# Patient Record
Sex: MALE | Race: BLACK OR AFRICAN AMERICAN | Employment: FULL TIME | URBAN - METROPOLITAN AREA
[De-identification: names, ages, dates, MRNs, and addresses within clinical notes are randomized per-mention and may not be internally consistent; named-entity substitution may affect disease eponyms.]

---

## 2017-05-26 ENCOUNTER — HOSPITAL ENCOUNTER (EMERGENCY)
Age: 22
Discharge: HOME | End: 2017-05-26
Attending: EMERGENCY MEDICINE
Payer: SELF-PAY

## 2017-05-26 ENCOUNTER — APPOINTMENT (OUTPATIENT)
Dept: GENERAL RADIOLOGY | Age: 22
End: 2017-05-26
Attending: EMERGENCY MEDICINE
Payer: SELF-PAY

## 2017-05-26 VITALS
HEIGHT: 62 IN | BODY MASS INDEX: 57.41 KG/M2 | SYSTOLIC BLOOD PRESSURE: 157 MMHG | HEART RATE: 87 BPM | WEIGHT: 312 LBS | DIASTOLIC BLOOD PRESSURE: 88 MMHG | RESPIRATION RATE: 19 BRPM | TEMPERATURE: 98.6 F | OXYGEN SATURATION: 99 %

## 2017-05-26 DIAGNOSIS — M25.572 ACUTE LEFT ANKLE PAIN: Primary | ICD-10-CM

## 2017-05-26 PROCEDURE — 74011250637 HC RX REV CODE- 250/637: Performed by: PHYSICIAN ASSISTANT

## 2017-05-26 PROCEDURE — 99283 EMERGENCY DEPT VISIT LOW MDM: CPT | Performed by: PHYSICIAN ASSISTANT

## 2017-05-26 PROCEDURE — 73610 X-RAY EXAM OF ANKLE: CPT

## 2017-05-26 RX ORDER — IBUPROFEN 800 MG/1
800 TABLET ORAL
Status: COMPLETED | OUTPATIENT
Start: 2017-05-26 | End: 2017-05-26

## 2017-05-26 RX ORDER — IBUPROFEN 800 MG/1
800 TABLET ORAL
Qty: 30 TAB | Refills: 0 | Status: SHIPPED | OUTPATIENT
Start: 2017-05-26

## 2017-05-26 RX ADMIN — IBUPROFEN 800 MG: 800 TABLET ORAL at 12:14

## 2017-05-26 NOTE — ED PROVIDER NOTES
HPI Comments: Patient presents to the ER complaining of 3 weeks history of left ankle/top of left ankle pain that is worse with lateral movement of left foot. Denies worsening pain with weightbearing or walking. 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 States he may have first felt pain when stepping out of the shower 3 weeks ago. No fall. Denies swelling, sensorimotor deficits or diabetes. No h/o gout, fever, nausea, vomiting. Patient is a 19 y.o. male presenting with ankle pain. Ankle Pain           No past medical history on file. No past surgical history on file. No family history on file. Social History     Social History    Marital status: SINGLE     Spouse name: N/A    Number of children: N/A    Years of education: N/A     Occupational History    Not on file. Social History Main Topics    Smoking status: Not on file    Smokeless tobacco: Not on file    Alcohol use Not on file    Drug use: Not on file    Sexual activity: Not on file     Other Topics Concern    Not on file     Social History Narrative         ALLERGIES: Review of patient's allergies indicates no known allergies. Review of Systems   Constitutional: Negative for chills, fatigue and fever. Musculoskeletal: Positive for arthralgias. Vitals:    05/26/17 1034   BP: 169/89   Pulse: 100   Resp: 20   Temp: 97.6 °F (36.4 °C)   SpO2: 99%   Weight: 141.5 kg (312 lb)   Height: 5' 2\" (1.575 m)            Physical Exam   Constitutional: He is oriented to person, place, and time. He appears well-developed and well-nourished. Very pleasant, heavyset male in no acute distress whatsoever. He can walk unassisted on both lower extremities. HENT:   Head: Normocephalic. Eyes: Conjunctivae and EOM are normal. Pupils are equal, round, and reactive to light. Neck: Normal range of motion. Neck supple. Abdominal: Soft. Bowel sounds are normal.   Musculoskeletal: Normal range of motion.  He exhibits no edema. Left foot: There is tenderness and swelling. There is normal range of motion, no bony tenderness, normal capillary refill, no deformity and no laceration. Feet:    Neurological: He is alert and oriented to person, place, and time. He has normal reflexes. No cranial nerve deficit. Coordination normal.   No Sensorimotor deficits. Skin: Skin is warm and dry. No rash noted. No erythema. Left foot and ankle not hot, no erythema or edema. Nursing note and vitals reviewed. MDM  Number of Diagnoses or Management Options  Acute left ankle pain: new and requires workup     Amount and/or Complexity of Data Reviewed  Tests in the radiology section of CPT®: ordered and reviewed  Independent visualization of images, tracings, or specimens: yes (I reviewed and interpreted left ankle x-ray myself.)    Risk of Complications, Morbidity, and/or Mortality  Presenting problems: moderate  Management options: moderate    Patient Progress  Patient progress: improved    ED Course       Procedures      The patient was observed in the ED. Results Reviewed:    XR ANKLE LT MIN 3 V    (Results Pending)     Patient with 3 week history of left ankle pain, no known injury. Patient can weight bear which does not increase pain. Likely a soft tissue/ligamentous or tendon strain. Will give course of nonsteroidals, our RICE instructions. Patient requests work note. I discussed the results of all labs, procedures, radiographs, and treatments with the patient and available family. Treatment plan is agreed upon and the patient is ready for discharge. All voiced understanding of the discharge plan and medication instructions or changes as appropriate. Questions about treatment in the ED were answered. All were encouraged to return should symptoms worsen or new problems develop.

## 2017-05-26 NOTE — LETTER
[facility] [facility] EMERGENCY DEPT One [address] [id number] [phone] Work/School Note Date: 5/26/2017 To Whom It May concern: 
 
Orion Alfred was seen and treated today in the emergency room by the following provider(s): 
Attending Provider: Shaquille Mars MD 
Physician Assistant: CALEB Nichols. Sincerely, CALEB Nichols

## 2018-05-10 ENCOUNTER — HOSPITAL ENCOUNTER (EMERGENCY)
Age: 23
Discharge: HOME | End: 2018-05-10
Attending: EMERGENCY MEDICINE
Payer: SELF-PAY

## 2018-05-10 VITALS
HEIGHT: 62 IN | OXYGEN SATURATION: 98 % | DIASTOLIC BLOOD PRESSURE: 84 MMHG | HEART RATE: 101 BPM | TEMPERATURE: 98.3 F | SYSTOLIC BLOOD PRESSURE: 160 MMHG | RESPIRATION RATE: 18 BRPM

## 2018-05-10 DIAGNOSIS — S39.94XA PENIS INJURY, INITIAL ENCOUNTER: Primary | ICD-10-CM

## 2018-05-10 PROCEDURE — 81003 URINALYSIS AUTO W/O SCOPE: CPT | Performed by: PHYSICIAN ASSISTANT

## 2018-05-10 PROCEDURE — 99284 EMERGENCY DEPT VISIT MOD MDM: CPT | Performed by: PHYSICIAN ASSISTANT

## 2018-05-10 PROCEDURE — 87491 CHLMYD TRACH DNA AMP PROBE: CPT | Performed by: PHYSICIAN ASSISTANT

## 2018-05-10 RX ORDER — MUPIROCIN 20 MG/G
OINTMENT TOPICAL 3 TIMES DAILY
Qty: 30 G | Refills: 0 | Status: SHIPPED | OUTPATIENT
Start: 2018-05-10

## 2018-05-10 RX ORDER — SULFAMETHOXAZOLE AND TRIMETHOPRIM 800; 160 MG/1; MG/1
1 TABLET ORAL 2 TIMES DAILY
Qty: 20 TAB | Refills: 0 | Status: SHIPPED | OUTPATIENT
Start: 2018-05-10 | End: 2018-05-20

## 2018-05-10 NOTE — ED NOTES
I have reviewed discharge instructions with the patient. The patient verbalized understanding. Patient left ED via Discharge Method: stretcher to Home with (SELF). Opportunity for questions and clarification provided. Patient given 2 scripts. To continue your aftercare when you leave the hospital, you may receive an automated call from our care team to check in on how you are doing. This is a free service and part of our promise to provide the best care and service to meet your aftercare needs.  If you have questions, or wish to unsubscribe from this service please call [phone]. Thank you for Choosing our [facility] Emergency Department.

## 2018-05-10 NOTE — Clinical Note
Wash two-three times daily with soap and water, blot dry, apply neosporin and a clean dressing. Watch for redness, swelling, pus, increasing pain, fever and return if any of those symptoms began. Return to the ED if worse.

## 2018-05-10 NOTE — LETTER
[address] [facility] EMERGENCY DEPT One [address] [id number] [phone] Work/School Note Date: 5/10/2018 To Whom It May concern: 
 
Monserrat Koo was seen and treated today in the emergency room by the following provider(s): 
Attending Provider: Va Huerta MD 
Physician Assistant: CALEB Clement. Monserrat Koo may return to work on 05/11/18. Sincerely, CALEB Clement

## 2018-05-10 NOTE — DISCHARGE INSTRUCTIONS
Wound Care: After Your Visit to the Emergency Room  Your Care Instructions  The care you need depends on the type of wound you have. Taking good care of your wound at home will help it heal quickly and will reduce your chance of infection. Even though you have been released from the emergency room, you still need to watch for any problems. The doctor carefully checked you. But sometimes problems can develop later. If you have new symptoms, or if your symptoms do not get better, return to the emergency room or call your doctor right away. A visit to the emergency room is only one step in your treatment. Even if you feel better, you still need to do what your doctor recommends, such as going to all suggested follow-up appointments and taking medicines exactly as directed. This will help you recover and help prevent future problems. How can you care for yourself at home? · Clean the area with soap and water 2 times a day, or as your doctor tells you. Don't use hydrogen peroxide or alcohol, which can slow healing. ¨ Unless your doctor gives you other directions, cover the wound with a thin layer of antibiotic ointment, such as bacitracin, and a bandage. Do not use an ointment that contains neomycin, because it can irritate the skin. ¨ Apply more ointment and replace the bandage as your doctor tells you. ¨ If the bandage is stuck to a scab, soak it in warm water to soften the scab. This will make the bandage easier to remove. · Ask your doctor if you can take an over-the-counter pain medicine. Do not take two or more pain medicines at the same time unless the doctor told you to. · Some pain is normal with a wound, but do not ignore pain that is getting worse instead of better. You could have an infection. · Your doctor may have closed your wound with stitches (sutures), staples, or skin glue. ¨ If you have stitches, your doctor may remove them after several days to 2 weeks.  Or you may have stitches that dissolve on their own. ¨ If you have staples, your doctor may remove them after 7 to 10 days. ¨ If your wound was closed with skin glue, the glue will wear off in a few days to 2 weeks. When should you call for help? Return to the emergency room now if:  · You have signs of infection, such as:  ¨ Increased pain, swelling, warmth, or redness around the wound. ¨ Red streaks leading from the wound. ¨ Pus draining from the wound. ¨ Swollen lymph nodes in your neck, armpits, or groin. ¨ A fever. · The wound starts to bleed, and blood soaks through the bandage. (Oozing small amounts of blood is normal.)  Call your doctor today if:  · The wound is not getting better each day. Where can you learn more? Go to [employer]  Enter P907 in the search box to learn more about \"Wound Care: After Your Visit to the Emergency Room. \"   © 5308-1819 [employer], [employer]orporated. Care instructions adapted under license by [facility] [employer] (which disclaims liability or warranty for this information). This care instruction is for use with your licensed healthcare professional. If you have questions about a medical condition or this instruction, always ask your healthcare professional. [place] [address] any warranty or liability for your use of this information.   Content Version: 9.3.73386; Last Revised: July 22, 2010

## 2018-05-10 NOTE — ED PROVIDER NOTES
HPI Comments: Patient is here with pain to his penis after he accidentally zipped it in his zipper the other day. This was 2-3 days ago. He has some crusting to the area today. There is no swelling to the area. No inguinal lymphadenopathy but he has had a little bit of dysuria. There was no injury to his urethra distally. No injury to his scrotum or testicles. He was ambulatory to the room without difficulty and well hydrated. No fever. Patient is a 23 y.o. male presenting with penile problem. The history is provided by the patient. Penis Injury   This is a new problem. The problem occurs constantly. The problem has not changed since onset. Primary symptoms include penile pain. Pertinent negatives include no dysuria, no genital itching, no genital lesions, no genital rash, no penile discharge, no testicular mass, no swelling, no scrotal pain, no priapism and no inability to urinate. There has been no fever. No past medical history on file. No past surgical history on file. No family history on file. Social History     Social History    Marital status: SINGLE     Spouse name: N/A    Number of children: N/A    Years of education: N/A     Occupational History    Not on file. Social History Main Topics    Smoking status: Not on file    Smokeless tobacco: Not on file    Alcohol use Not on file    Drug use: Not on file    Sexual activity: Not on file     Other Topics Concern    Not on file     Social History Narrative         ALLERGIES: Review of patient's allergies indicates no known allergies. Review of Systems   Constitutional: Negative. HENT: Negative. Eyes: Negative. Respiratory: Negative. Cardiovascular: Negative. Gastrointestinal: Negative. Genitourinary: Positive for penile pain. Negative for dysuria and penile discharge. Musculoskeletal: Negative. Skin: Negative. Neurological: Negative. Psychiatric/Behavioral: Negative.     All other systems reviewed and are negative. Vitals:    05/10/18 1032   BP: 160/84   Pulse: (!) 101   Resp: 18   Temp: 98.3 °F (36.8 °C)   SpO2: 98%   Height: 5' 2\" (1.575 m)            Physical Exam   Constitutional: He is oriented to person, place, and time. He appears well-developed and well-nourished. HENT:   Head: Normocephalic and atraumatic. Right Ear: External ear normal.   Left Ear: External ear normal.   Nose: Nose normal.   Mouth/Throat: Oropharynx is clear and moist.   Eyes: Conjunctivae and EOM are normal. Pupils are equal, round, and reactive to light. Neck: Normal range of motion. Neck supple. Cardiovascular: Normal rate, regular rhythm, normal heart sounds and intact distal pulses. Pulmonary/Chest: Effort normal and breath sounds normal.   Abdominal: Soft. Bowel sounds are normal.   Genitourinary:       No swelling in the scrotum or testes. Penile tenderness present. No penile erythema. No discharge found. Genitourinary Comments: Dr. Lida Pierce into see patient with me. Musculoskeletal: Normal range of motion. Neurological: He is alert and oriented to person, place, and time. He has normal reflexes. Skin: Skin is warm and dry. Psychiatric: He has a normal mood and affect. His behavior is normal. Judgment and thought content normal.   Nursing note and vitals reviewed. MDM  Number of Diagnoses or Management Options  Penis injury, initial encounter: minor     Amount and/or Complexity of Data Reviewed  Clinical lab tests: ordered and reviewed  Discuss the patient with other providers: yes (Dr. Lida Pierce, he saw the patient with me.)    Risk of Complications, Morbidity, and/or Mortality  Presenting problems: moderate  Diagnostic procedures: moderate  Management options: moderate          ED Course       Procedures      The patient was observed in the ED.     Results Reviewed:    Urine dipstick is normal.   With patient's dysuria I have sent the urine for gonorrhea and chlamydia although I feel this is low suspicion so he was not treated today. We did send him with an antibiotic by mouth for the injury to the shaft of the penis and what appears to be a superficial infection. He can also use the Bactroban on it as well. He should return to the emergency room if worsening in anyway otherwise follow-up with primary care for recheck. I discussed the results of all labs, procedures, radiographs, and treatments with the patient and available family. Treatment plan is agreed upon and the patient is ready for discharge. All voiced understanding of the discharge plan and medication instructions or changes as appropriate. Questions about treatment in the ED were answered. All were encouraged to return should symptoms worsen or new problems develop.

## 2018-05-11 LAB
C TRACH RRNA SPEC QL NAA+PROBE: NEGATIVE
N GONORRHOEA RRNA SPEC QL NAA+PROBE: NEGATIVE
SPECIMEN SOURCE: NORMAL

## 2024-03-05 NOTE — ED TRIAGE NOTES
Patient states his skin around his groin got caught on his zipper. Denies any blood, just painful. Received request via: Pharmacy    Was the patient seen in the last year in this department? Yes    Does the patient have an active prescription (recently filled or refills available) for medication(s) requested? No    Pharmacy Name: [facility]/pharmacy #[id number] - [place], [place] - [address] [facility]     Does the patient have USP Plus and need 100 day supply (blood pressure, diabetes and cholesterol meds only)? Yes, quantity updated to 100 days